# Patient Record
Sex: FEMALE | Race: WHITE
[De-identification: names, ages, dates, MRNs, and addresses within clinical notes are randomized per-mention and may not be internally consistent; named-entity substitution may affect disease eponyms.]

---

## 2019-02-25 ENCOUNTER — HOSPITAL ENCOUNTER (INPATIENT)
Dept: HOSPITAL 36 - ER | Age: 64
LOS: 3 days | Discharge: ANOTHER HOSPITAL | DRG: 690 | End: 2019-02-28
Attending: INTERNAL MEDICINE | Admitting: INTERNAL MEDICINE
Payer: MEDICARE

## 2019-02-25 VITALS — SYSTOLIC BLOOD PRESSURE: 126 MMHG | DIASTOLIC BLOOD PRESSURE: 85 MMHG

## 2019-02-25 DIAGNOSIS — E03.9: ICD-10-CM

## 2019-02-25 DIAGNOSIS — C79.51: ICD-10-CM

## 2019-02-25 DIAGNOSIS — R73.9: ICD-10-CM

## 2019-02-25 DIAGNOSIS — N39.0: Primary | ICD-10-CM

## 2019-02-25 DIAGNOSIS — C50.912: ICD-10-CM

## 2019-02-25 DIAGNOSIS — E87.1: ICD-10-CM

## 2019-02-25 DIAGNOSIS — R65.10: ICD-10-CM

## 2019-02-25 DIAGNOSIS — R26.9: ICD-10-CM

## 2019-02-25 DIAGNOSIS — K21.9: ICD-10-CM

## 2019-02-25 DIAGNOSIS — F31.64: ICD-10-CM

## 2019-02-25 LAB
ALBUMIN SERPL-MCNC: 3.9 GM/DL (ref 3.7–5.3)
ALBUMIN/GLOB SERPL: 2.1 {RATIO} (ref 1–1.8)
ALP SERPL-CCNC: 61 U/L (ref 34–104)
ALT SERPL-CCNC: 8 U/L (ref 7–52)
ANION GAP SERPL CALC-SCNC: 14.8 MMOL/L (ref 7–16)
AST SERPL-CCNC: 12 U/L (ref 13–39)
BASOPHILS # BLD AUTO: 0.3 TH/CUMM (ref 0–0.2)
BASOPHILS NFR BLD AUTO: 4.1 % (ref 0–2)
BILIRUB SERPL-MCNC: 0.4 MG/DL (ref 0.3–1)
BUN SERPL-MCNC: 20 MG/DL (ref 7–25)
CALCIUM SERPL-MCNC: 8.8 MG/DL (ref 8.6–10.3)
CHLORIDE SERPL-SCNC: 100 MEQ/L (ref 98–107)
CO2 SERPL-SCNC: 23.1 MEQ/L (ref 21–31)
CREAT SERPL-MCNC: 0.9 MG/DL (ref 0.6–1.2)
EOSINOPHIL # BLD AUTO: 0.1 TH/CMM (ref 0.1–0.4)
EOSINOPHIL NFR BLD AUTO: 1.8 % (ref 0–5)
ERYTHROCYTE [DISTWIDTH] IN BLOOD BY AUTOMATED COUNT: 13.8 % (ref 11.5–20)
GLOBULIN SER-MCNC: 1.9 GM/DL
GLUCOSE SERPL-MCNC: 174 MG/DL (ref 70–105)
HCT VFR BLD CALC: 38.3 % (ref 41–60)
HGB BLD-MCNC: 12.6 GM/DL (ref 12–16)
INR PPP: 1.09 (ref 0.5–1.4)
LYMPHOCYTE AB SER FC-ACNC: 0.5 TH/CMM (ref 1.5–3)
LYMPHOCYTES NFR BLD AUTO: 7.1 % (ref 20–50)
MCH RBC QN AUTO: 32.4 PG (ref 27–31)
MCHC RBC AUTO-ENTMCNC: 32.8 PG (ref 28–36)
MCV RBC AUTO: 98.6 FL (ref 81–100)
MONOCYTES # BLD AUTO: 0.7 TH/CMM (ref 0.3–1)
MONOCYTES NFR BLD AUTO: 9.6 % (ref 2–10)
NEUTROPHILS # BLD: 5.2 TH/CMM (ref 1.8–8)
NEUTROPHILS NFR BLD AUTO: 77.4 % (ref 40–80)
PLATELET # BLD: 322 TH/CMM (ref 150–400)
PMV BLD AUTO: 7.9 FL
POTASSIUM SERPL-SCNC: 3.9 MEQ/L (ref 3.5–5.1)
PROTHROMBIN TIME: 11.3 SECONDS (ref 9.5–11.5)
RBC # BLD AUTO: 3.89 MIL/CMM (ref 3.8–5.2)
SODIUM SERPL-SCNC: 134 MEQ/L (ref 136–145)
WBC # BLD AUTO: 6.8 TH/CMM (ref 4.8–10.8)

## 2019-02-25 PROCEDURE — Z7610: HCPCS

## 2019-02-25 RX ADMIN — DEXTROSE AND SODIUM CHLORIDE SCH MLS/HR: 5; .45 INJECTION, SOLUTION INTRAVENOUS at 22:08

## 2019-02-25 NOTE — ED PHYSICIAN CHART
ED Chief Complaint/HPI





- Patient Information


Date Seen:: 02/25/19


Time Seen:: 14:12


Chief Complaint:: agitation


History of Present Illness:: 





this is a 62 yo female who is here because of concern over her mental 

condition.  she will be evaluated for placement in the psych unit in this 

hospital


Allergies:: 


 Allergies











Allergy/AdvReac Type Severity Reaction Status Date / Time


 


No Known Allergies Allergy   Verified 02/25/19 14:04











Vitals:: 


 Vital Signs - 8 hr











  02/25/19





  14:04


 


Temp 98.4 F


 


HR 91


 


RR 17


 


/70


 


O2 Sat % 96











Historian:: Patient, Medical Records


Review:: Nurse's Note Reviewed, Transfer documents Reviewed





ED Review of Systems





- Review of Systems


General/Constitutional: No fever, No chills, No weight loss, No weakness, No 

diaphoresis, No edema, No loss of appetite


Skin: No skin lesions, No rash, No bruising


Head: No headache, No light-headedness


Eyes: No loss of vision, No pain, No diplopia


ENT: No earache, No nasal drainage, No sore throat, No tinnitus


Neck: No neck pain, No swelling, No thyromegaly, No stiffness, No mass noted


Cardio Vascular: No chest pain, No palpitations, No PND, No orthopnea, No edema


Pulmonary: No SOB, No cough, No sputum, No wheezing


GI: No nausea, No vomiting, No diarrhea, No pain, No melena, No hematochezia, 

No constipation, No hematemesis


G/U: No dysuria, No frequency, No hematuria


Musculoskeletal: No bone or joint pain, No back pain, No muscle pain


Endocrine: No polyuria, No polydipsia


Psychiatric: Prior psych history, No depression, No anxiety, No suicidal 

ideation


Hematopoietic: No bruising, No lymphadenopathy


Allergic/Immuno: No urticaria, No angioedema


Neurological: No syncope, No focal symptoms, No weakness, No paresthesia, No 

headache, No seizure, No dizziness, No confusion, No vertigo





ED Past Medical History





- Past Medical History


Obtainable: Yes


Past Medical History: Other ( psychosis, and breast cancer)


Family History: None


Social History: Non Smoker, No Alcohol, No Drug Use, 


Surgical History: other (left breast biopsy)


Psychiatricy History: Schizophrenia, Bipolar


Medication: Reviewed





Family Medical History





- Family Member


  ** Mother


History Unknown: Yes





ED Physical Exam





- Physical Examination


General/Constitutional: Awake, Well-developed, well-nourished, Alert, No 

distress, GCS 15, Non-toxic appearing, Ambulatory


Head: Atraumatic


Eyes: Lids, conjuctiva normal, PERRL, EOMI


Skin: Nl inspection, No rash, No skin lesions, No ecchymosis, Well hydrated, No 

lymphadenopathy


ENMT: External ears, nose nl, Nasal exam nl, Lips, teeth, gums nl


Neck: Nontender, Full ROM w/o pain, No JVD, No nuchal rigidity, No bruit, No 

mass, No stridor


Respiratory: Nl effort/Exclusion, Clear to Auscultation, No Wheeze/Rhonchi/Rales


Cardio Vascular: RRR, No murmur, gallop, rubs, NL S1 S2


GI: No tenderness/rebounding/guarding, No organomegaly, No hernia, Normal BS's, 

Nondistended, No mass/bruits, No McBurney tenderness


: No CVA tenderness


Extremities: No tenderness or effusion, Full ROM, normal strength in all 

extremities, No edema, Normal digits & nails


Neuro/Psych: Alert/oriented, DTR's symmetric, Normal sensory exam, Normal motor 

strength, Judgement/insight normal, Mood normal (depressd mood), Normal gait, 

No focal deficits


Misc: Normal back, No paraspinal tenderness





ED Labs/Radiology/EKG Results





- Radiology Results


Results: 





chest x-ray = nad





- EKG Interpretations


EKG Time:: 14:02


Rate & Rhythm: rate = 103, sinus


Axis: left axis





ED Assessment





- Assessment


General Assessment: 





psychosis





ED Septic Shock





- .


Is Septic Shock (SBP<90, OR Lactate>4 mmol\L) present?: No





- <6hrs of presentation:


Vital Signs: 


 Vital Signs - 8 hr











  02/25/19





  14:04


 


Temp 98.4 F


 


HR 91


 


RR 17


 


/70


 


O2 Sat % 96














ED Reassessment (Disposition)





- Reassessment


Reassessment Condition:: Unchanged





- Diagnosis


Diagnosis:: 





psychosis





- Patient Disposition


Discharge/Transfer:: Acute Care w/in this hosp


Admitting Medical Physician:: Alfred Deng


Admitting Psych Physician:: Denis Garcia

## 2019-02-25 NOTE — DIAGNOSTIC IMAGING REPORT
Portable chest x-ray



HISTORY: Cough



There is a poor inspiration.  The heart appears to be somewhat enlarged.

 Atherosclerotic calcination seen in the aorta.  Deformity consistent

with old left rib fractures noted.  No hilar or mediastinal

abnormalities.  A vascular catheter tip is in the superior vena cava

near the junction with the right atrium.  Arthritic changes noted about

the shoulder regions.



IMPRESSION:

1.  No definite acute pulmonary processes

2.  Deformity consistent with old left rib fracture

3.  Cardiomegaly with atherosclerotic vascular changes

## 2019-02-26 LAB
APPEARANCE UR: (no result)
BACTERIA #/AREA URNS HPF: (no result) /HPF
BILIRUB UR-MCNC: NEGATIVE MG/DL
COLOR UR: YELLOW
EPI CELLS URNS QL MICRO: (no result) /LPF
GLUCOSE UR STRIP-MCNC: NEGATIVE MG/DL
KETONES UR STRIP-MCNC: NEGATIVE MG/DL
LEUKOCYTE ESTERASE UR-ACNC: (no result)
MICRO URNS: YES
NITRITE UR QL STRIP: NEGATIVE
PH UR STRIP: 6 [PH] (ref 4.6–8)
PROT UR STRIP-MCNC: (no result) MG/DL
RBC # UR STRIP: (no result) /UL
RBC #/AREA URNS HPF: (no result) /HPF (ref 0–5)
SP GR UR STRIP: 1.02 (ref 1–1.03)
URINALYSIS COMPLETE PNL UR: (no result)
UROBILINOGEN UR STRIP-ACNC: 0.2 E.U./DL (ref 0.2–1)
WBC #/AREA URNS HPF: (no result) /HPF (ref 0–5)

## 2019-02-26 RX ADMIN — TOLTERODINE TARTRATE SCH MG: 4 CAPSULE, EXTENDED RELEASE ORAL at 09:47

## 2019-02-26 RX ADMIN — PANTOPRAZOLE SODIUM SCH MG: 40 TABLET, DELAYED RELEASE ORAL at 09:47

## 2019-02-26 RX ADMIN — LEVOTHYROXINE SODIUM SCH MG: 50 TABLET ORAL at 06:46

## 2019-02-26 RX ADMIN — DEXTROSE AND SODIUM CHLORIDE SCH MLS/HR: 5; .45 INJECTION, SOLUTION INTRAVENOUS at 17:50

## 2019-02-26 NOTE — CONSULTATION
DATE OF CONSULTATION:  



HISTORY OF PRESENT ILLNESS:  This is a patient who is well known to me.  The

patient has a history of breast carcinoma and the patient has been on

chemotherapy.  The patient has stage IV cancer.  The patient recently was found

to have excessive amount of nausea.  The patient's medicines were readjusted and

the patient's nausea is much better.



Recently, the patient has been more agitated.  She has been having shakes and

agitation.  She is unable to sleep.  The patient was brought to the Emergency

Room from where the patient was admitted to the hospital.



The patient has no chest pain, has no shortness of breath.  However, the patient

is very agitated and she is having shakes mostly of the upper extremities.



The patient has a history of breast carcinoma, which is stage IV cancer with

bone metastatic disease.  The patient recently had a PET scan, which showed

cancer confined to the bones and the patient is on chemotherapy and the patient

is also on anastrozole.



The patient also has a history of bipolar and has been recently agitated.



PHYSICAL EXAMINATION:

GENERAL:  The patient appears agitated.  The patient is tired, says she has

insomnia.

HEENT:  Normocephalic.

NECK:  Supple.  JVP is not raised.  Thyroid is not palpable and trachea central.

CHEST:  Symmetrical, both parts of the chest moving equally with respiration.

LUNGS:  There are normal breath sounds bilaterally.

CARDIOVASCULAR:  S1, S2 normal.  There is no S3.

ABDOMEN:  Soft.  Liver, spleen, kidney not palpable.  No masses, no ascites, no

shifting dullness.

EXTREMITIES:  No evidence of thrombophlebitis or edema.



IMPRESSION:

1.  Breast carcinoma with bony metastatic disease stage IV cancer.  The patient

is on anastrozole and chemotherapy

2.  Bipolar, depression, agitation.



PLAN:  The patient oncologically is stable.  The patient needs a psych

evaluation and may be the adjustment of the medications.



Thank you for allowing me to participate in the care of this patient.  We will

follow with you in the hospital as needed.





DD: 02/26/2019 17:54

DT: 02/26/2019 21:40

JOB# [id number]  [id number]

## 2019-02-26 NOTE — DIAGNOSTIC IMAGING REPORT
Head CT without intravenous contrast



Indication: Metastatic disease



Comparison: None 



Technique: Axial images were obtained from the vertex to the skull base

without IV contrast. Coronal reconstructions were made.   Total DLP:

725,  [id number]





FINDINGS:



Exam is limited due to lack of IV contrast.  Images of the brain

obtained without contrast demonstrate no evidence of acute hemorrhage.  

No mass effect or midline shift.  No evidence of a skull fracture or

focal soft tissue swelling.  There may have been old nasal fractures. 

Note is made of abnormal sclerotic densities seen along the skull base

including the clivus.  There are also is suggestion of additional subtle

calvarial sclerotic lesions.  The visualized paranasal sinuses are

clear.



IMPRESSION:



Limited exam due to lack of IV contrast.



Abnormal sclerotic density seen along the skull base including the

clivus region.  There is also suggestion of additional subtle calvarial

sclerotic lesions.  Findings may be due to metastatic disease. 

Recommend clinical correlation and further assessment with nuclear

medicine bone scan.



No acute intracranial abnormality identified.  No gross intracranial

masses on this noncontrast exam.  Further assessment with IV contrast if

indicated.

## 2019-02-26 NOTE — CONSULTATION
DATE OF CONSULTATION:  02/26/2019



REQUESTING PHYSICIAN:  Alfred Deng DO



REASON FOR CONSULTATION:  History of psychosis.



HISTORY OF PRESENT ILLNESS:  This patient is a 63-year-old woman admitted for

agitation and psychosis.  This patient reported to have been diagnosed to have

schizoaffective disorder and has been on multiple medications.  The patient is

also reported to have been diagnosed to have a history of metastatic left breast

cancer and the patient is being followed up by Dr. Larson.  The patient has been

admitted over here because of increasing agitation.  The patient has a PICC line

in right upper extremity.  A psychiatric consultation is called to address the

issue of the agitation.  The patient is interviewed:  Staff was spoken to.  The

patient is being followed up by Dr. Underwood on an outpatient basis.  The patient

is currently on Depakote and Seroquel, Risperdal, and trazodone.  During the

interview, the patient has been noted to be very anxious and is stating that she

is very much worried about the Trump is saying on the TV and she states that her

life is affected by it.  The patient has been having difficult time to cope with

the stress.  Sleep is noted to be poor.  Appetite is also noted to be poor.



PAST PSYCHIATRIC HISTORY:  The patient is stating that she is under the

psychiatric treatment.  She was hospitalized once before, but she is not able to

give the details.



MEDICAL HISTORY:  The patient is reported to have a history of metastatic breast

CA and the patient has GERD and the patient is being medically stabilized at

this time.



PAST PSYCHIATRIC HISTORY:  Please refer to the above.



SOCIAL HISTORY:  The patient is living with her family and the patient has been

diagnosed with the cancer of the breast, but there is no history of any diabetes

or hypertension.  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

child.



MENTAL STATUS EXAMINATION:  The patient is a 63-year-old, looking her stated

age.  The patient has been presenting with the tremor in both the extremities

and she would stop shaking for a while and then goes on.  Mood is noted to be

anxious and depressed.  Affect is constricted.  The patient is paranoid.  The

patient has been focused on Trump whom she has been seeing on the TV.  The

patient is not presenting with any command hallucinations.  The patient is not

suicidal or homicidal.  Insight and judgment are noted to be fair.  Impulse

control seems to be fair.  The patient has been stating that she is scheduled to

see Dr. Underwood pretty soon.  The patient is alert and oriented to time, place and

person.



DIAGNOSTIC IMPRESSION:

AXIS I:  Bipolar disorder, mixed with psychotic symptoms, rule out

schizoaffective disorder.

AXIS II:  None.

AXIS III:  As per Dr. Deng.



IMMEDIATE TREATMENT PLAN:  The patient is going to be continued on her current

medications the Depakote, Risperdal, and Seroquel.  Once the patient is

medically stabilized, the patient is possibly going to be discharged to the

family for followup on outpatient basis.





DD: 02/26/2019 20:42

DT: 02/26/2019 21:05

JOB# [id number]  [id number]

## 2019-02-26 NOTE — HISTORY & PHYSICAL
ADMIT DATE:  02/25/2019



CHIEF COMPLAINT:  Increasing agitation.



HISTORY OF PRESENT ILLNESS:  This is a 63-year-old  female with history

of metastatic left breast cancer, bipolar disorder, gastroesophageal reflux

disease, gait instability, admitted secondary to increasing agitation.  The

patient has a PICC line in the right upper extremity, unable to be cleared for

Geropsych admission.



PAST MEDICAL HISTORY:  As mentioned in history of present illness.



PAST SURGICAL HISTORY:  Status post right upper extremity PICC line, left breast

lumpectomy as well as implants.



ALLERGIES:  No known drug allergies.



MEDICATIONS:  Lasix ____ Depakote, Colace, fentanyl 100 mcg, pantoprazole,

Seroquel, Risperdal, trazodone, ____ 



FAMILY HISTORY:  Denies diabetes or coronary artery disease.



SOCIAL HISTORY:  Nonsmoker, nondrinker.  Intermittent drug use.  She is 

with one child.  Used to be a teacher.



REVIEW OF SYSTEMS:

GENERAL:  Complains not feeling well.

HEENT:  No blurred vision or pain.

LUNGS:  No diagnosis of chronic obstructive pulmonary disease or asthma.

HEART:  The patient denies hypertension.

ABDOMEN:  The patient with on and off pain, history of reflux.

HEMATOLOGY/ONCOLOGY:  The patient with a left breast cancer, being followed by

Dr. Gauthier; last chemo was about last week.

PSYCHIATRIC:  Stable.



PHYSICAL EXAMINATION:

VITAL SIGNS:  Blood pressure 143/85, respiratory rate 18, pulse 93, and

temperature 98.2.

GENERAL:  An elderly female, appears chronically ill.

NECK:  Supple.  No mass.

LUNGS:  Equal breath sounds, few rhonchi.

HEART:  Regular rate and rhythm without appreciable murmur.

ABDOMEN:  Soft and globular.  Positive bowel sounds.

EXTREMITIES:  Positive excoriation.

NEUROLOGIC:  Limited.  Right upper extremity PICC line.



LABORATORY DATA:  WBC 6, hemoglobin 12, and platelets 322.  PTT 23.  Sodium 134,

potassium 3.9, BUN 20, creatinine 0.9, blood sugar 174.  TSH 2.02.  UA:  Trace

wbc and moderate bacteria.



ASSESSMENT AND PLAN:  Agitation, urinary tract infection, metastatic left breast

cancer, hyperglycemia, hyponatremia, hypothyroidism, gait instability,

gastroesophageal reflux disease.



PLAN:  I will continue the patient on oxygen ____ continue on IV antibiotic. 

Continue pain medication.  We will refer the patient to Psychiatry as well as

Oncology.  Please see my orders.





DD: 02/26/2019 12:51

DT: 02/26/2019 13:08

[facility]# [id number]  [id number]

## 2019-02-27 LAB
ANION GAP SERPL CALC-SCNC: 13.6 MMOL/L (ref 7–16)
BASOPHILS NFR BLD: 0 % (ref 0–3)
BUN SERPL-MCNC: 14 MG/DL (ref 7–25)
CALCIUM SERPL-MCNC: 9.2 MG/DL (ref 8.6–10.3)
CHLORIDE SERPL-SCNC: 106 MEQ/L (ref 98–107)
CO2 SERPL-SCNC: 23.2 MEQ/L (ref 21–31)
CREAT SERPL-MCNC: 0.7 MG/DL (ref 0.6–1.2)
EOSINOPHIL NFR BLD: 2 % (ref 0–5)
ERYTHROCYTE [DISTWIDTH] IN BLOOD BY AUTOMATED COUNT: 13.7 % (ref 11.5–20)
GLUCOSE SERPL-MCNC: 113 MG/DL (ref 70–105)
HCT VFR BLD CALC: 36.1 % (ref 41–60)
HGB BLD-MCNC: 12 GM/DL (ref 12–16)
LYMPHOCYTES # BLD MANUAL: 7 % (ref 20–50)
MAGNESIUM SERPL-MCNC: 2.2 MG/DL (ref 1.9–2.7)
MCH RBC QN AUTO: 32.5 PG (ref 27–31)
MCHC RBC AUTO-ENTMCNC: 33.1 PG (ref 28–36)
MCV RBC AUTO: 98.2 FL (ref 81–100)
MONOCYTES # BLD MANUAL: 16 % (ref 2–10)
NEUTROPHILS NFR BLD AUTO: 75 % (ref 40–80)
NEUTS BAND NFR BLD: 0 % (ref 0–10)
PLATELET # BLD EST: ADEQUATE 10*3/UL
PLATELET # BLD: 313 TH/CMM (ref 150–400)
PMV BLD AUTO: 8.8 FL
POTASSIUM SERPL-SCNC: 3.8 MEQ/L (ref 3.5–5.1)
RBC # BLD AUTO: 3.68 MIL/CMM (ref 3.8–5.2)
SODIUM SERPL-SCNC: 139 MEQ/L (ref 136–145)
WBC # BLD AUTO: 6 TH/CMM (ref 4.8–10.8)

## 2019-02-27 RX ADMIN — PANTOPRAZOLE SODIUM SCH MG: 40 TABLET, DELAYED RELEASE ORAL at 08:53

## 2019-02-27 RX ADMIN — DEXTROSE AND SODIUM CHLORIDE SCH MLS/HR: 5; .45 INJECTION, SOLUTION INTRAVENOUS at 23:12

## 2019-02-27 RX ADMIN — TOLTERODINE TARTRATE SCH MG: 4 CAPSULE, EXTENDED RELEASE ORAL at 08:52

## 2019-02-27 RX ADMIN — LEVOTHYROXINE SODIUM SCH MG: 50 TABLET ORAL at 07:00

## 2019-02-27 NOTE — INTERNAL MEDICINE PROG NOTE
Internal Medicine Subjective





- Subjective


Patient seen and examined:: with staff, chart reviewed, other (motehr at bedside

)


Patient is:: awake, verbal, interactive, in bed, denies any new complaints


Per staff patient has:: no adverse event, no episodes of fall, poor appetite, 

tolerating meds





Internal Medicine Objective





- Results


Result Diagrams: 


 02/27/19 04:42





 02/27/19 04:42


Recent Labs: 


 Laboratory Last Values











WBC  6.0 Th/cmm (4.8-10.8)   02/27/19  04:42    


 


RBC  3.68 Mil/cmm (3.80-5.20)  L  02/27/19  04:42    


 


Hgb  12.0 gm/dL (12-16)   02/27/19  04:42    


 


Hct  36.1 % (41.0-60)  L  02/27/19  04:42    


 


MCV  98.2 fl ()   02/27/19  04:42    


 


MCH  32.5 pg (27.0-31.0)  H  02/27/19  04:42    


 


MCHC Differential  33.1 pg (28.0-36.0)   02/27/19  04:42    


 


RDW  13.7 % (11.5-20.0)   02/27/19  04:42    


 


Plt Count  313 Th/cmm (150-400)   02/27/19  04:42    


 


MPV  8.8 fl  02/27/19  04:42    


 


Add Manual Diff  YES   02/27/19  04:42    


 


Neutrophils %  77.4 % (40.0-80.0)   02/25/19  14:20    


 


Band Neutrophils %  0 % (0-10)   02/27/19  04:42    


 


Lymphocytes %  7.1 % (20.0-50.0)  L  02/25/19  14:20    


 


Monocytes %  9.6 % (2.0-10.0)   02/25/19  14:20    


 


Eosinophils %  1.8 % (0.0-5.0)   02/25/19  14:20    


 


Basophils %  4.1 % (0.0-2.0)  H  02/25/19  14:20    


 


Neutrophils (Manual)  75 % (40-80)   02/27/19  04:42    


 


Lymphocytes  7 % (20-50)  L  02/27/19  04:42    


 


Monocytes  16 % (2-10)  H  02/27/19  04:42    


 


Eosinophils  2 % (0-5)   02/27/19  04:42    


 


Basophils  0 % (0-3)   02/27/19  04:42    


 


Platelet Estimate  ADEQUATE  (NORMAL)   02/27/19  04:42    


 


PT  11.3 SECONDS (9.5-11.5)   02/25/19  14:10    


 


INR  1.09  (0.5-1.4)   02/25/19  14:10    


 


PTT (Actin FS)  23.2 SECONDS (26.0-38.0)  L  02/25/19  14:10    


 


Sodium  139 mEq/L (136-145)   02/27/19  04:42    


 


Potassium  3.8 mEq/L (3.5-5.1)   02/27/19  04:42    


 


Chloride  106 mEq/L ()   02/27/19  04:42    


 


Carbon Dioxide  23.2 mEq/L (21.0-31.0)   02/27/19  04:42    


 


Anion Gap  13.6  (7.0-16.0)   02/27/19  04:42    


 


BUN  14 mg/dL (7-25)   02/27/19  04:42    


 


Creatinine  0.7 mg/dL (0.6-1.2)   02/27/19  04:42    


 


Est GFR ( Amer)  > 60.0 ml/min (>90)   02/27/19  04:42    


 


Est GFR (Non-Af Amer)  > 60.0 ml/min  02/27/19  04:42    


 


BUN/Creatinine Ratio  20.0   02/27/19  04:42    


 


Glucose  113 mg/dL ()  H  02/27/19  04:42    


 


Calcium  9.2 mg/dL (8.6-10.3)   02/27/19  04:42    


 


Magnesium  2.2 mg/dL (1.9-2.7)   02/27/19  04:42    


 


Total Bilirubin  0.4 mg/dL (0.3-1.0)   02/25/19  14:20    


 


AST  12 U/L (13-39)  L  02/25/19  14:20    


 


ALT  8 U/L (7-52)   02/25/19  14:20    


 


Alkaline Phosphatase  61 U/L ()   02/25/19  14:20    


 


Ammonia  60 umol/L (16-53)  H  02/27/19  04:42    


 


Troponin I  < 0.01 ng/mL (0.01-0.05)  L  02/25/19  14:20    


 


B-Natriuretic Peptide  31.2 pg/mL (5.0-100.0)   02/27/19  04:42    


 


Total Protein  5.8 gm/dL (6.0-8.3)  L  02/25/19  14:20    


 


Albumin  3.9 gm/dL (3.7-5.3)   02/25/19  14:20    


 


Globulin  1.9 gm/dL  02/25/19  14:20    


 


Albumin/Globulin Ratio  2.1  (1.0-1.8)  H  02/25/19  14:20    


 


TSH  6.02 uIU/ml (0.34-5.60)  H  02/25/19  14:20    


 


Urine Source  CLEAN C   02/26/19  00:29    


 


Urine Color  YELLOW   02/26/19  00:29    


 


Urine Clarity  HAZY  (CLEAR)   02/26/19  00:29    


 


Urine pH  6.0  (4.6 - 8.0)   02/26/19  00:29    


 


Ur Specific Gravity  1.025  (1.005-1.030)   02/26/19  00:29    


 


Urine Protein  TRACE mg/dL (NEGATIVE)   02/26/19  00:29    


 


Urine Glucose (UA)  NEGATIVE mg/dL (NEGATIVE)   02/26/19  00:29    


 


Urine Ketones  NEGATIVE mg/dL (NEGATIVE)   02/26/19  00:29    


 


Urine Blood  TRACE  (NEGATIVE)   02/26/19  00:29    


 


Urine Nitrate  NEGATIVE  (NEGATIVE)   02/26/19  00:29    


 


Urine Bilirubin  NEGATIVE  (NEGATIVE)   02/26/19  00:29    


 


Urine Urobilinogen  0.2 E.U./dL (0.2 - 1.0)   02/26/19  00:29    


 


Ur Leukocyte Esterase  SMALL  (NEGATIVE)  H  02/26/19  00:29    


 


Urine RBC  0-2 /hpf (0-5)   02/26/19  00:29    


 


Urine WBC  25-50 /hpf (0-5)  H  02/26/19  00:29    


 


Ur Epithelial Cells  MODERATE /lpf (FEW)   02/26/19  00:29    


 


Urine Bacteria  MODERATE /hpf (NONE SEEN)  H  02/26/19  00:29    














- Physical Exam


Vitals and I&O: 


 Vital Signs











Temp  98.5 F   02/27/19 12:00


 


Pulse  111   02/27/19 12:00


 


Resp  19   02/27/19 12:00


 


BP  167/82   02/27/19 12:00


 


Pulse Ox  95   02/27/19 12:00








 Intake & Output











 02/26/19 02/27/19 02/27/19





 18:59 06:59 18:59


 


Intake Total 1000  


 


Balance 1000  


 


Intake:   


 


  Intake, IV Amount 1000  


 


    D5-0.45NS 1,000 ml @ 60 1000  





    mls/hr IV .[id number] [facility]   





    #:[id number]   











Active Medications: 


Current Medications





Acetaminophen (Tylenol)  650 mg PO Q4H PRN


   PRN Reason: Pain Or Fever above 101


   Stop: 04/26/19 20:43


   Last Admin: 02/27/19 08:52 Dose:  650 mg


Al Hydrox/Mg Hydrox/Simethicone (Maalox)  30 ml PO Q6H PRN


   PRN Reason: Dyspepsia


   Stop: 04/26/19 20:43


Albuterol Sulfate (Albuterol 2.5mg/3ml Neb Ud)  2.5 mg HHN Q2HRT PRN


   PRN Reason: Shortness of Breath or Wheeze


   Stop: 04/26/19 20:43


Anastrozole (Arimidex)  1 mg PO DAILY [facility]; Protocol


   Stop: 04/27/19 08:59


   Last Admin: 02/27/19 08:52 Dose:  1 mg


Divalproex Sodium (Depakote Er)  250 mg PO BID [facility]; Protocol


   Stop: 04/27/19 08:59


   Last Admin: 02/27/19 08:53 Dose:  250 mg


Docusate Sodium (Colace)  250 mg PO BID [facility]


   Stop: 04/27/19 08:59


   Last Admin: 02/27/19 08:53 Dose:  250 mg


Fentanyl (Duragesic 100 Mcg/Hr Tdm Patch)  1 patch TD Q72H [facility]; Protocol


   Stop: 04/27/19 13:59


   Last Admin: 02/26/19 15:30 Dose:  1 patch


Guaifenesin (Robitussin)  200 mg PO Q4HR PRN


   PRN Reason: Cough or Congestion


   Stop: 04/26/19 20:43


Heparin Sodium (Porcine) (Heparin)  5,000 units SUBQ Q12HR [facility]


   Stop: 04/26/19 20:59


   Last Admin: 02/27/19 08:54 Dose:  5,000 units


Dextrose/Sodium Chloride (D5-0.45ns)  1,000 mls @ 60 mls/hr IV .[id number] [facility]


   Stop: 04/26/19 20:44


   Last Admin: 02/26/19 17:50 Dose:  60 mls/hr


Ipratropium Bromide (Atrovent Neb 0.5mg/2.5ml)  0.5 mg HHN Q2HRT PRN


   PRN Reason: Shortness of Breath or Wheeze


   Stop: 04/26/19 20:43


Levothyroxine Sodium (Synthroid)  0.05 mg PO QDAC [facility]


   Stop: 04/27/19 07:29


   Last Admin: 02/27/19 07:00 Dose:  0.05 mg


Ondansetron HCl (Zofran)  4 mg IV Q8H PRN


   PRN Reason: Nausea / Vomiting


   Stop: 04/26/19 20:43


Pantoprazole Sodium (Protonix)  40 mg PO DAILY [facility]


   Stop: 04/27/19 08:59


   Last Admin: 02/27/19 08:53 Dose:  40 mg


Quetiapine Fumarate (Seroquel)  25 mg PO HS [facility]; Protocol


   Stop: 04/27/19 20:59


   Last Admin: 02/26/19 21:33 Dose:  25 mg


Tolterodine Tartrate (Detrol La)  4 mg PO DAILY [facility]


   Stop: 04/27/19 08:59


   Last Admin: 02/27/19 08:52 Dose:  4 mg


Trazodone HCl (Desyrel)  50 mg PO HS [facility]; Protocol


   Stop: 04/26/19 20:59


   Last Admin: 02/26/19 21:33 Dose:  50 mg


Zolpidem Tartrate (Ambien)  10 mg PO HS PRN


   PRN Reason: Insomnia


   Stop: 04/26/19 20:43


   Last Admin: 02/26/19 23:07 Dose:  10 mg








General: alert


HEENT: NC/AT, PERRLA


Neck: Supple, No JVD


Lungs: CTAB


Cardiovascular: RRR, Normal S1, Normal S2, without murmur


Abdomen: soft, non-tender, globular, positive bowel sound


Extremities: excoriation, contracture





Internal Medicine Assmt/Plan





- Assessment


Assessment: 





ASSESSMENT AND PLAN:  Agitation, urinary tract infection, metastatic left breast


cancer, hyperglycemia, hyponatremia, hypothyroidism, gait instability,


gastroesophageal reflux disease.











- Plan


Plan: 





mother refused dc





PLAN:  I will continue the patient on oxygen ____ continue on IV antibiotic. 


Continue pain medication.  We will refer the patient to Psychiatry as well as


Oncology.  Please see my orders.

## 2019-02-28 LAB
ANION GAP SERPL CALC-SCNC: 10.5 MMOL/L (ref 7–16)
BASOPHILS # BLD AUTO: 0 TH/CUMM (ref 0–0.2)
BUN SERPL-MCNC: 14 MG/DL (ref 7–25)
CALCIUM SERPL-MCNC: 9 MG/DL (ref 8.6–10.3)
CHLORIDE SERPL-SCNC: 110 MEQ/L (ref 98–107)
CO2 SERPL-SCNC: 25.4 MEQ/L (ref 21–31)
CREAT SERPL-MCNC: 0.7 MG/DL (ref 0.6–1.2)
EOSINOPHIL # BLD AUTO: 0.2 TH/CMM (ref 0.1–0.4)
EOSINOPHIL NFR BLD: 3 % (ref 0–5)
ERYTHROCYTE [DISTWIDTH] IN BLOOD BY AUTOMATED COUNT: 13.6 % (ref 11.5–20)
GLUCOSE SERPL-MCNC: 104 MG/DL (ref 70–105)
HCT VFR BLD CALC: 33.5 % (ref 41–60)
HGB BLD-MCNC: 11.1 GM/DL (ref 12–16)
LYMPHOCYTE AB SER FC-ACNC: 0.5 TH/CMM (ref 1.5–3)
LYMPHOCYTES # BLD MANUAL: 14 % (ref 20–50)
MCH RBC QN AUTO: 33 PG (ref 27–31)
MCHC RBC AUTO-ENTMCNC: 33.3 PG (ref 28–36)
MCV RBC AUTO: 99.3 FL (ref 81–100)
MONOCYTES # BLD AUTO: 0.8 TH/CMM (ref 0.3–1)
MONOCYTES # BLD MANUAL: 15 % (ref 2–10)
NEUTROPHILS # BLD: 2.8 TH/CMM (ref 1.8–8)
NEUTROPHILS NFR BLD AUTO: 68 % (ref 40–80)
PLATELET # BLD EST: ADEQUATE 10*3/UL
PLATELET # BLD: 283 TH/CMM (ref 150–400)
PMV BLD AUTO: 8.8 FL
POTASSIUM SERPL-SCNC: 3.9 MEQ/L (ref 3.5–5.1)
RBC # BLD AUTO: 3.37 MIL/CMM (ref 3.8–5.2)
SODIUM SERPL-SCNC: 142 MEQ/L (ref 136–145)
WBC # BLD AUTO: 4.3 TH/CMM (ref 4.8–10.8)

## 2019-02-28 RX ADMIN — TOLTERODINE TARTRATE SCH MG: 4 CAPSULE, EXTENDED RELEASE ORAL at 09:07

## 2019-02-28 RX ADMIN — PANTOPRAZOLE SODIUM SCH MG: 40 TABLET, DELAYED RELEASE ORAL at 09:08

## 2019-02-28 RX ADMIN — LEVOTHYROXINE SODIUM SCH MG: 50 TABLET ORAL at 06:39

## 2019-02-28 NOTE — DISCHARGE SUMMARY
DATE OF DISCHARGE:  02/28/2019



CHIEF COMPLAINT:  Increasing agitation.



FINAL DIAGNOSES:  Urinary tract infection, increasing agitation, breast

carcinoma, hyperglycemia, hyponatremia, hypothyroidism, gait instability, and

GERD.



HISTORY:  This 62-year-old  female with history of metastatic breast

cancer with blood disorder, GERD, admitted initially secondary to increasing

agitation.  This patient is supposed to go to [facility], but given the patient's

PICC line.  The patient was unable to be admitted.  The patient also diagnosed

with UTI in the interim.



PHYSICAL EXAMINATION:

VITAL SIGNS:  Blood pressure 140/80, respiration 19, pulse 90, temperature 98.4.

GENERAL:  Elderly female, appears her stated age, chronically ill.

NECK:  Supple.  No mass.

LUNGS:  Equal breath sounds, few rhonchi.

HEART:  Regular rate and rhythm without appreciable murmurs.

ABDOMEN:  Soft, globular.

EXTREMITIES:  Positive excoriation.  PICC line to right upper extremity.



HOSPITAL COURSE:  The patient was admitted to medical floor, continue oxygen and

bronchodilator treatment and IV hydration and IV antibiotic.  The patient was

referred to Dr. Gauthier for Oncology, Dr. Garcia for Psychiatry.  The patient to

be transferred to Behavioral Unit could take care of a PICC line.



CONDITION ON DISCHARGE:  Fair.



DISCHARGE INSTRUCTIONS:  The patient to be transferred under the psychiatric

attending.  The patient to follow with Oncology as well upon discharge.





DD: 02/28/2019 14:52

DT: 02/28/2019 20:32

JOB# [id number]  [id number]

## 2019-03-01 NOTE — PROGRESS NOTES
DATE:  02/28/2019



PSYCHIATRIC PROGRESS NOTE



SUBJECTIVE:  Staff was spoken to.  The patient is interviewed.  Mood is noted to

be anxious.  The patient started to shake a lot and when she is told to hold on

shaking, the patient stopped shaking of her hands.  Insight and judgment are

noted to be still impaired.  Impulse control is noted to be poor.  The patient

has paranoid delusions and the patient is feeling that she has ____.



ASSESSMENT:  The patient is still psychotic, but not suicidal or homicidal.



PLAN:  To continue the patient with the supportive therapy.  I encouraged the

patient to verbalize the concerns rather than to act out.





DD: 02/28/2019 19:50

DT: 03/01/2019 06:46

JOB# [id number]  [id number]